# Patient Record
Sex: MALE
[De-identification: names, ages, dates, MRNs, and addresses within clinical notes are randomized per-mention and may not be internally consistent; named-entity substitution may affect disease eponyms.]

---

## 2019-02-17 ENCOUNTER — HOSPITAL ENCOUNTER (EMERGENCY)
Dept: HOSPITAL 42 - ED | Age: 74
Discharge: HOME | End: 2019-02-17
Payer: MEDICARE

## 2019-02-17 VITALS — SYSTOLIC BLOOD PRESSURE: 125 MMHG | DIASTOLIC BLOOD PRESSURE: 53 MMHG | HEART RATE: 75 BPM

## 2019-02-17 VITALS — OXYGEN SATURATION: 99 % | TEMPERATURE: 98 F | RESPIRATION RATE: 19 BRPM

## 2019-02-17 VITALS — BODY MASS INDEX: 29.5 KG/M2

## 2019-02-17 DIAGNOSIS — Z85.038: ICD-10-CM

## 2019-02-17 DIAGNOSIS — Z93.3: ICD-10-CM

## 2019-02-17 DIAGNOSIS — I10: ICD-10-CM

## 2019-02-17 DIAGNOSIS — R10.9: Primary | ICD-10-CM

## 2019-02-17 LAB
ALBUMIN SERPL-MCNC: 4.3 G/DL (ref 3–4.8)
ALBUMIN/GLOB SERPL: 1.3 {RATIO} (ref 1.1–1.8)
ALT SERPL-CCNC: 17 U/L (ref 7–56)
APTT BLD: 29.1 SECONDS (ref 26.9–38.3)
AST SERPL-CCNC: 26 U/L (ref 17–59)
BASOPHILS # BLD AUTO: 0.01 K/MM3 (ref 0–2)
BASOPHILS NFR BLD: 0.1 % (ref 0–3)
BUN SERPL-MCNC: 19 MG/DL (ref 7–21)
CALCIUM SERPL-MCNC: 9.1 MG/DL (ref 8.4–10.5)
EOSINOPHIL # BLD: 0.2 10*3/UL (ref 0–0.7)
EOSINOPHIL NFR BLD: 1.9 % (ref 1.5–5)
ERYTHROCYTE [DISTWIDTH] IN BLOOD BY AUTOMATED COUNT: 12.6 % (ref 11.5–14.5)
GFR NON-AFRICAN AMERICAN: > 60
HGB BLD-MCNC: 15.3 G/DL (ref 14–18)
INR PPP: 1.03
LIPASE SERPL-CCNC: 96 U/L (ref 23–300)
LYMPHOCYTES # BLD: 0.5 10*3/UL (ref 1.2–3.4)
LYMPHOCYTES NFR BLD AUTO: 5.7 % (ref 22–35)
MCH RBC QN AUTO: 31 PG (ref 25–35)
MCHC RBC AUTO-ENTMCNC: 34.2 G/DL (ref 31–37)
MCV RBC AUTO: 90.7 FL (ref 80–105)
MONOCYTES # BLD AUTO: 0.4 10*3/UL (ref 0.1–0.6)
MONOCYTES NFR BLD: 4.9 % (ref 1–6)
PLATELET # BLD: 200 10^3/UL (ref 120–450)
PMV BLD AUTO: 9.9 FL (ref 7–11)
PROTHROMBIN TIME: 11.6 SECONDS (ref 9.4–12.5)
RBC # BLD AUTO: 4.94 10^6/UL (ref 3.5–6.1)
WBC # BLD AUTO: 8.4 10^3/UL (ref 4.5–11)

## 2019-02-17 PROCEDURE — 80053 COMPREHEN METABOLIC PANEL: CPT

## 2019-02-17 PROCEDURE — 96376 TX/PRO/DX INJ SAME DRUG ADON: CPT

## 2019-02-17 PROCEDURE — 96361 HYDRATE IV INFUSION ADD-ON: CPT

## 2019-02-17 PROCEDURE — 93005 ELECTROCARDIOGRAM TRACING: CPT

## 2019-02-17 PROCEDURE — 85610 PROTHROMBIN TIME: CPT

## 2019-02-17 PROCEDURE — 99285 EMERGENCY DEPT VISIT HI MDM: CPT

## 2019-02-17 PROCEDURE — 96374 THER/PROPH/DIAG INJ IV PUSH: CPT

## 2019-02-17 PROCEDURE — 83735 ASSAY OF MAGNESIUM: CPT

## 2019-02-17 PROCEDURE — 85025 COMPLETE CBC W/AUTO DIFF WBC: CPT

## 2019-02-17 PROCEDURE — 74177 CT ABD & PELVIS W/CONTRAST: CPT

## 2019-02-17 PROCEDURE — 83690 ASSAY OF LIPASE: CPT

## 2019-02-17 PROCEDURE — 85730 THROMBOPLASTIN TIME PARTIAL: CPT

## 2019-02-17 PROCEDURE — 96375 TX/PRO/DX INJ NEW DRUG ADDON: CPT

## 2019-02-17 NOTE — ED PDOC
Arrival/HPI





- General


Chief Complaint: Abdominal Pain


Time Seen by Provider: 02/17/19 11:16


Historian: Patient





- History of Present Illness


Narrative History of Present Illness (Text): 





02/17/19 11:26


73 year old male, whose past medical history includes colon cancer s/p 

colostomy, presents to the emergency department complaining of abdominal pain 

that began last night. Patient reports the pain is constant and describes it as 

"some punched him in the stomach". Patient reports he took methadone with no 

relief. Patient denies any fever, chills, chest pain, shortness of breath, 

hematochezia, nausea, vomiting, diarrhea, urinary symptoms, back pain, neck 

pain, headache, dizziness, or any other complaints. 





PMD: Dr. Barber


GI: None


Time/Duration: Other (last night)


Symptom Onset: Gradual


Symptom Course: Unchanged


Activities at Onset: Light


Context: Home





Past Medical History





- Provider Review


Nursing Documentation Reviewed: Yes





- Infectious Disease


Hx of Infectious Diseases: None





- Tetanus Immunization


Tetanus Immunization: Unknown





- Past Medical History


Past Medical History: No Previous





- Cardiac


Hx Cardiac Disorders: Yes


Hx Hypertension: Yes





- Pulmonary


Hx Respiratory Disorders: No





- Neurological


Hx Neurological Disorder: No





- HEENT


Hx HEENT Disorder: No





- Renal


Hx Renal Disorder: No





- Endocrine/Metabolic


Hx Endocrine Disorders: No





- Hematological/Oncological


Hx Blood Disorders: Yes


Hx Blood Transfusions: No


Hx Blood Transfusion Reaction: No


Hx Cancer: Yes (colon ca with colostomy)





- Integumentary


Hx Dermatological Disorder: Yes (COLOSTOMY NTO LEFT LOWER ABDOMINAL AREA)





- Musculoskeletal/Rheumatological


Hx Musculoskeletal Disorders: Yes (RIGHT HAND SX)


Hx Arthritis: Yes


Hx Back Pain: Yes


Hx Falls: No


Hx Spinal Stenosis: Yes


Hx Unsteady Gait: Yes (CANE,WALKER)


Other/Comment: pt takes methadone and percocet for chronic right hand pain





- Gastrointestinal


Hx Gastrointestinal Disorders: Yes


Hx Colostomy: Yes (LEFT LOWER ABDOMEN)


Hx Diverticulitis: Yes


Hx Hemorrhoids: Yes


Other/Comment: HEMOIRRHOIDS ,GI BLEED, Colon CA





- Genitourinary/Gynecological


Hx Genitourinary Disorders: No


Hx Reproductive Disorders: No





- Psychiatric


Hx Psychophysiologic Disorder: Yes


Hx Anxiety: Yes


Hx Depression: Yes


Hx Emotional Abuse: No


Hx Physical Abuse: No


Hx Substance Use: No





- Surgical History


Hx Orthopedic Surgery: Yes (R HAND)


Other/Comment: colostomy





- Anesthesia


Hx Anesthesia: Yes


Hx Anesthesia Reactions: No (admission says yes, per patient no reaction)


Hx Malignant Hyperthermia: No





- Suicidal Assessment


Feels Threatened In Home Enviroment: No





Family/Social History





- Physician Review


Nursing Documentation Reviewed: Yes


Family/Social History: No Known Family HX


Smoking Status: Never Smoked


Hx Alcohol Use: Yes


Frequency of alcohol use: Socially


Hx Substance Use: No


Hx Substance Use Treatment: No





Allergies/Home Meds


Allergies/Adverse Reactions: 


Allergies





No Known Allergies Allergy (Verified 02/17/19 11:15)


   








Home Medications: 


                                    Home Meds











 Medication  Instructions  Recorded  Confirmed


 


Methadone 30 mg PO BID 06/22/15 02/17/19














Review of Systems





- Physician Review


All systems were reviewed & negative as marked: Yes





- Review of Systems


Constitutional: absent: Fevers, Other (chills)


Respiratory: absent: SOB


Cardiovascular: absent: Chest Pain


Gastrointestinal: Abdominal Pain.  absent: Diarrhea, Nausea, Vomiting


Genitourinary Male: absent: Dysuria, Frequency, Hematuria


Musculoskeletal: absent: Back Pain, Neck Pain


Neurological: absent: Headache, Dizziness





Physical Exam


Vital Signs Reviewed: Yes


Appearance: Positive for: Well-Appearing, Non-Toxic, Comfortable


Pain Distress: None


Mental Status: Positive for: Alert and Oriented X 3





- Systems Exam


Head: Present: Atraumatic, Normocephalic


Pupils: Present: PERRL


Extroacular Muscles: Present: EOMI


Conjunctiva: Present: Normal


Mouth: Present: Moist Mucous Membranes


Neck: Present: Normal Range of Motion


Respiratory/Chest: Present: Clear to Auscultation, Good Air Exchange.  No: 

Respiratory Distress, Accessory Muscle Use


Cardiovascular: Present: Regular Rate and Rhythm, Normal S1, S2.  No: Murmurs


Abdomen: Present: Tenderness, Guarding.  No: Distention, Peritoneal Signs, 

Rebound


Back: Present: Normal Inspection


Upper Extremity: Present: Normal Inspection.  No: Cyanosis, Edema


Lower Extremity: Present: Normal Inspection.  No: Edema


Neurological: Present: GCS=15, CN II-XII Intact, Speech Normal


Skin: Present: Warm, Dry, Normal Color.  No: Rashes


Psychiatric: Present: Alert, Oriented x 3, Normal Insight, Normal Concentration





Medical Decision Making


ED Course and Treatment: 





02/17/19 11:26


Impression:


73 year old male presents complaining of constant abdominal pain that began last

night. PE shows abdominal tenderness with guarding.  





Differential Diagnosis included but are not limited to:  Abdominal mass VS 

Gastroenteritis





Plan:


-- CT Abd pelvis PO & IV Contrast 


-- Labs


-- Morphine, Pepcid, IV Fluids, Zofran Inj


-- EKG 


-- Reassess and disposition





Prior Visits:


Notes and results from previous visits were reviewed. 





Progress Notes:





EKG shows NSR at 98 BPM with incomplete RBBB, T wave inversion lead III. 

Interpreted by me. 








02/17/19 18:28


Patient improved with Morphine 4mg IV x2  for pain control. CT results pending. 





02/17/2019 18:45


Abd/Pelvis CT


IMPRESSION: Postsurgical changes with colostomy in the left lower quadrant of 

the abdomen.  Some herniation of bowel at the colostomy site.  Postoperative 

changes lower pelvis.  A grade 1 anterolisthesis at the lumbosacral junction.  

Close clinical correlation is advised.


Dictator: Miguel Freeman MD





02/17/19 18:54


Patient feels better. Abdomen is soft and NT. ND. He is moving bowels via his 

colostomy bag. He will be discharged home with PMD f/u with Dr. Barber. 





- Lab Interpretations


I have reviewed the lab results: Yes





- RAD Interpretation


Radiology Orders: 











02/17/19 11:23


ABD PELVIS PO & IV CONTRAST [CT] Stat 











: Radiologist





- EKG Interpretation


Interpreted by ED Physician: Yes


Type: 12 lead EKG





- Medication Orders


Current Medication Orders: 











Famotidine (Pepcid)  20 mg IVP STAT STA


   Stop: 02/17/19 11:24


Sodium Chloride (Sodium Chloride 0.9%)  1,000 mls @ 1,000 mls/hr IV .Q1H STA


   Stop: 02/17/19 12:22


Morphine Sulfate (Morphine)  4 mg IVP STAT STA


   Stop: 02/17/19 11:24


Ondansetron HCl (Zofran Inj)  4 mg IVP STAT STA


   Stop: 02/17/19 11:24











- Scribe Statement


The provider has reviewed the documentation as recorded by the Hiwot Vora





Provider Scribe Attestation:


All medical record entries made by the Scribe were at my direction and 

personally dictated by me. I have reviewed the chart and agree that the record 

accurately reflects my personal performance of the history, physical exam, 

medical decision making, and the department course for this patient. I have also

personally directed, reviewed, and agree with the discharge instructions and 

disposition.








Disposition/Present on Arrival





- Present on Arrival


Any Indicators Present on Arrival: No


History of DVT/PE: No


History of Uncontrolled Diabetes: No


Urinary Catheter: No


History of Decub. Ulcer: No


History Surgical Site Infection Following: None





- Disposition


Have Diagnosis and Disposition been Completed?: Yes


Diagnosis: 


 Abdominal pain





Disposition: HOME/ ROUTINE


Disposition Time: 18:55


Patient Problems: 


                             Current Active Problems











Problem Status Onset


 


Abdominal pain Acute 











Condition: IMPROVED


Additional Instructions: 





FRANCIS CONTRERAS, thank you for letting us take care of you today. Your provider 

was Boris Josue DO and you were treated for Abdomen Pain. The emergency 

medical care you received today was directed at your acute symptoms. If you were

prescribed any medication, please fill it and take as directed. It may take 

several days for your symptoms to resolve. Return to the Emergency Department if

your symptoms worsen, do not improve, or if you have any other problems.





Please contact your doctor or call one of the physicians/clinics you have been 

referred to that are listed on the Patient Visit Information form that is 

included in your discharge packet. Bring any paperwork you were given at 

discharge with you along with any medications you are taking to your follow up 

visit. Our treatment cannot replace ongoing medical care by a primary care 

provider outside of the emergency department.





Thank you for allowing the [email or web address] team to be part of your care today.








If you had an X-Ray or CT scan: A Radiologist will review the ED reading if any 

change in treatment is needed we will contact you.***





If you had a blood, urine, or wound culture: It will take several days for the 

results, if any change in treatment is needed we will contact you.***





If you had an STI test: It will take 48 hours for the results. Please call after

1 week if you have not heard back.***


Prescriptions: 


Ibuprofen [Motrin] 600 mg PO Q6 PRN #30 tab


 PRN Reason: Pain, Moderate (4-7)


Referrals: 


Aidan Barber DO [Family Provider] - Follow up with primary


Forms:  [employer] (English), WORK NOTE

## 2019-02-18 NOTE — CT
Date of service: 



02/17/2019



PROCEDURE:  CT Abdomen and Pelvis with contrast



HISTORY:

Abdominal pain. 



Relevant surgical history: Colon resection for rectal carcinoma 



COMPARISON:

12/06/2014.  CT abdomen and pelvis



TECHNIQUE:

Intravenous contrast dose: 100 cc Omnipaque 350



Radiation dose:



Total exam DLP = 1174.21 mGy-cm.



This CT exam was performed using one or more of the following dose 

reduction techniques: Automated exposure control, adjustment of the 

mA and/or kV according to patient size, and/or use of iterative 

reconstruction technique.



FINDINGS:



LOWER THORAX:

Unremarkable. 



LIVER:

 Hepatic steatosis. No focal masses.  No intrahepatic bile duct 

dilatation or perihepatic ascites.  



GALLBLADDER AND BILE DUCTS:

Unremarkable. 



PANCREAS:

Unremarkable. No gross lesion or ductal dilatation.



SPLEEN:

Mild splenomegaly without focal abnormality. 



ADRENALS:

Unremarkable. No mass. 



KIDNEYS AND URETERS:

Unremarkable. No hydronephrosis. No solid mass. 



VASCULATURE:

Unremarkable. No aortic aneurysm. Atherosclerotic calcification and 

mural plaque present.  Findings are seen throughout the aorta 

extending into the iliac arteries.



BOWEL:

Stable appearance of colostomy.



Interval improvement in presacral postoperative change.  



APPENDIX:

Normal appendix. 



PERITONEUM:

Unremarkable. No free fluid. No free air. 



LYMPH NODES:

Unremarkable. No enlarged lymph nodes. 



BLADDER:

Collapsed urinary bladder.  No focal abnormalities detected. 



REPRODUCTIVE:

Unremarkable. 



BONES:

No acute fracture.  Multilevel degenerative changes.  Grade 1 

anterolisthesis L5-S1. 



OTHER FINDINGS:

None.



IMPRESSION:

No acute or significant findings related to/ accounting  for the 

clinical presentation.



Additional benign and/or incidental findings described above.  

Includes postoperative findings related to rectal resection and 

colostomy. 



No significant interval change compared to the prior examination(s).



________________________________________________



Concordant results (preliminary interpretation) provided by [facility].



Procedure Completed: 16:45.



Preliminary Report: Dictated and Authenticated: 18:45.



Final Interpretation: 08:18.
